# Patient Record
Sex: FEMALE | NOT HISPANIC OR LATINO | URBAN - NONMETROPOLITAN AREA
[De-identification: names, ages, dates, MRNs, and addresses within clinical notes are randomized per-mention and may not be internally consistent; named-entity substitution may affect disease eponyms.]

---

## 2019-06-04 ENCOUNTER — OFFICE VISIT (OUTPATIENT)
Dept: MEDICAL GROUP | Facility: CLINIC | Age: 29
End: 2019-06-04
Payer: MEDICAID

## 2019-06-04 ENCOUNTER — NON-PROVIDER VISIT (OUTPATIENT)
Dept: MEDICAL GROUP | Facility: CLINIC | Age: 29
End: 2019-06-04
Payer: MEDICAID

## 2019-06-04 VITALS
WEIGHT: 292 LBS | HEIGHT: 70 IN | OXYGEN SATURATION: 96 % | BODY MASS INDEX: 41.8 KG/M2 | TEMPERATURE: 97.6 F | HEART RATE: 85 BPM | DIASTOLIC BLOOD PRESSURE: 90 MMHG | SYSTOLIC BLOOD PRESSURE: 155 MMHG

## 2019-06-04 DIAGNOSIS — E78.49 OTHER HYPERLIPIDEMIA: ICD-10-CM

## 2019-06-04 DIAGNOSIS — J45.990 EXERCISE-INDUCED ASTHMA: ICD-10-CM

## 2019-06-04 DIAGNOSIS — L68.0 HIRSUTISM: ICD-10-CM

## 2019-06-04 PROCEDURE — 36415 COLL VENOUS BLD VENIPUNCTURE: CPT | Performed by: PHYSICIAN ASSISTANT

## 2019-06-04 PROCEDURE — 99202 OFFICE O/P NEW SF 15 MIN: CPT | Performed by: PHYSICIAN ASSISTANT

## 2019-06-04 RX ORDER — ALBUTEROL SULFATE 90 UG/1
2 AEROSOL, METERED RESPIRATORY (INHALATION) EVERY 6 HOURS PRN
Qty: 8.5 G | Refills: 5 | Status: SHIPPED | OUTPATIENT
Start: 2019-06-04

## 2019-06-04 NOTE — LETTER
[facility]  Pcp Pt States None  No address on file  Fax: [phone]   Authorization for Release/Disclosure of   Protected Health Information   Name: TRUDI BARRIOS : 1990 SSN: [id number]   Address: [facility] [address]  [facility] [place] [address] Phone:    [phone] (home)    I authorize the entity listed below to release/disclose the PHI below to:   [facility]/Pcp Pt States None and Deepthi Gomez P.A.-C.   Provider or Entity Name:  Dr. Sutton     Address   City, [facility]   Phone:      Fax:     Reason for request: continuity of care   Information to be released:    [  ] LAST COLONOSCOPY,  including any PATH REPORT and follow-up  [  ] LAST FIT/COLOGUARD RESULT [  ] LAST DEXA  [  ] LAST MAMMOGRAM  [  ] LAST PAP  [  ] LAST LABS [  ] RETINA EXAM REPORT  [  ] IMMUNIZATION RECORDS  [ x ] Release all info      [  ] Check here and initial the line next to each item to release ALL health information INCLUDING  _____ Care and treatment for drug and / or alcohol abuse  _____ HIV testing, infection status, or AIDS  _____ Genetic Testing    DATES OF SERVICE OR TIME PERIOD TO BE DISCLOSED: _____________  I understand and acknowledge that:  * This Authorization may be revoked at any time by you in writing, except if your health information has already been used or disclosed.  * Your health information that will be used or disclosed as a result of you signing this authorization could be re-disclosed by the recipient. If this occurs, your re-disclosed health information may no longer be protected by State or Federal laws.  * You may refuse to sign this Authorization. Your refusal will not affect your ability to obtain treatment.  * This Authorization becomes effective upon signing and will  on (date) __________.      If no date is indicated, this Authorization will  one (1) year from the signature date.    Name: Trudi Barrios    Signature:   Date:     2019       PLEASE FAX REQUESTED RECORDS BACK TO:  [phone]

## 2019-06-04 NOTE — NON-PROVIDER
Estefani Styles is a 28 y.o. female here for a non-provider visit for Venipuncture    If abnormal was an in office provider notified today (if so, indicate provider)? Yes  Routed to PCP? Yes

## 2019-06-04 NOTE — PROGRESS NOTES
"cc:  Endo referral    Subjective:     Lisandra Styles is a 28 y.o. female presenting for endocrinology referral.      Patient presents to office today requesting an endocrinology referral. Patient has a problem with hirsuitism.  She has had thyroid testing and glucose testing and heart testing in the past.  She has had difficulty loosing weight. She has tried to cut out her carbs and has increased her exercise and has not had any success.  She used to have painful periods.  She has been working at portion control and states that this has been an ongoing issue as a teen.  She is not sure about a history of enodmetriosis.  She will feel tired after heavy carbs.     Patient indicates that she has a history of exercise induced asthma.  She states that she has been out of her inhalers and indicates that she was taking a ventolin inhaler and an advair inhaler.  She does not feel that it has worked.     Review of systems:  See above.  Denies any other symptoms unless previously indicated.      Current Outpatient Prescriptions:   •  fluticasone-salmeterol (ADVAIR) 250-50 MCG/DOSE AEROSOL POWDER, BREATH ACTIVATED, Inhale 1 Puff by mouth every 12 hours., Disp: 1 Inhaler, Rfl: 5  •  albuterol 108 (90 Base) MCG/ACT Aero Soln inhalation aerosol, Inhale 2 Puffs by mouth every 6 hours as needed for Shortness of Breath., Disp: 8.5 g, Rfl: 5  •  ibuprofen (MOTRIN) 800 MG TABS, Take 800 mg by mouth every 8 hours as needed for Mild Pain. May take for pain every 6 hours , Disp: , Rfl:     Allergies, past medical history, past surgical history, family history, social history reviewed and updated    Objective:     Vitals: /90 (BP Location: Left arm, Patient Position: Sitting)   Pulse 85   Temp 36.4 °C (97.6 °F) (Temporal)   Ht 1.778 m (5' 10\")   Wt (!) 132.5 kg (292 lb)   SpO2 96%   BMI 41.90 kg/m²   General: Alert, pleasant, NAD  EYES:   PERRL, EOMI, no icterus or pallor.  Conjunctivae and lids normal.   HENT:  Normocephalic. "  External ears normal.  No nasal drainage present.  Moderate hirsutism present on chin.   Neck supple.    Heart: Regular rate and rhythm.  S1 and S2 normal.  No murmurs appreciated.  Respiratory: Decreased breath sounds with inspiration and expiration.  No accessory muscle use present.  Abdomen: Obese  Skin: Warm, dry, no rashes.  Musculoskeletal: Gait is normal.  Moves all extremities well.  Neurological: No tremors, sensation grossly intact, CN2-12 intact.  Psych:  Affect/mood is normal, judgement is good, memory is intact, grooming is appropriate.    Assessment/Plan:     Estefani was seen today for referral needed.    Diagnoses and all orders for this visit:    Hirsutism  -     CBC WITH DIFFERENTIAL; Future  -     Comp Metabolic Panel; Future  -     TSH+FREE T4  -     TESTOSTERONE, FREE, FEMALES/CHILDREN; Future  -     FSH/LH; Future  -     CORTISOL; Future  -     ESTROGENS FRACTIONATED; Future    Labs been ordered to evaluate further.  Consideration given for spironolactone but would like to have test results first.  Will reconsider with follow-up.    Exercise-induced asthma  -     fluticasone-salmeterol (ADVAIR) 250-50 MCG/DOSE AEROSOL POWDER, BREATH ACTIVATED; Inhale 1 Puff by mouth every 12 hours.  -     albuterol 108 (90 Base) MCG/ACT Aero Soln inhalation aerosol; Inhale 2 Puffs by mouth every 6 hours as needed for Shortness of Breath.    We will start patient on Advair 250/50 and add albuterol as the rescue inhaler.  We will follow-up in 2 to 4 weeks.  If no improvement, consider spirometry.    Other hyperlipidemia  -     Lipid Profile; Future  Labs been ordered to evaluate further.      Return in about 4 weeks (around 7/2/2019), or if symptoms worsen or fail to improve, for 2-4 weeks.    Please note that this dictation was created using voice recognition software. I have made every reasonable attempt to correct obvious errors, but expect that there are errors of grammar and possible content that I did not  discover before finalizing note.

## 2019-07-02 ENCOUNTER — OFFICE VISIT (OUTPATIENT)
Dept: MEDICAL GROUP | Facility: CLINIC | Age: 29
End: 2019-07-02
Payer: MEDICAID

## 2019-07-02 VITALS
BODY MASS INDEX: 41.95 KG/M2 | DIASTOLIC BLOOD PRESSURE: 92 MMHG | HEART RATE: 75 BPM | HEIGHT: 70 IN | SYSTOLIC BLOOD PRESSURE: 162 MMHG | RESPIRATION RATE: 16 BRPM | OXYGEN SATURATION: 95 % | WEIGHT: 293 LBS | TEMPERATURE: 98.1 F

## 2019-07-02 DIAGNOSIS — D75.89 MACROCYTOSIS: ICD-10-CM

## 2019-07-02 DIAGNOSIS — L68.0 HIRSUTISM: ICD-10-CM

## 2019-07-02 DIAGNOSIS — R73.9 HYPERGLYCEMIA: ICD-10-CM

## 2019-07-02 DIAGNOSIS — J45.990 EXERCISE-INDUCED ASTHMA: ICD-10-CM

## 2019-07-02 DIAGNOSIS — R79.89 ELEVATED TESTOSTERONE LEVEL IN FEMALE: ICD-10-CM

## 2019-07-02 DIAGNOSIS — R79.89 ELEVATED LFTS: ICD-10-CM

## 2019-07-02 DIAGNOSIS — E78.49 OTHER HYPERLIPIDEMIA: ICD-10-CM

## 2019-07-02 PROCEDURE — 99214 OFFICE O/P EST MOD 30 MIN: CPT | Performed by: PHYSICIAN ASSISTANT

## 2019-07-02 NOTE — PROGRESS NOTES
"cc:  Follow up labs    Subjective:     Estefani Styles is a 29 y.o. female presenting for follow up labs      Patient presents to the office to follow up with lab results. She denies fever, chills and nausea. She has been to gynecology in the past and has not felt that she has had much success.  She has had episodes of amenorrhea and incredibly painful periods.  She states that in the past she has been told that she has polycystic ovaries although no cysts were ever seen on ultrasound.  She has been tried on birth control which has not been helpful.  She has been dealing with this for many years and is quite frustrated that she has not been able to make any progress peer    She states that she has been trying to use the advair on a daily basis.  She states that she has not noticed it helped, but when she misses a dose, she does find it's harder to breath.  She does acknowledge that she has not used her albuterol as often indicating that she has been without it for years and forgets that she has it.        Review of systems:  See above.  Denies any other symptoms unless previously indicated.        Current Outpatient Prescriptions:   •  fluticasone-salmeterol (ADVAIR) 500-50 MCG/DOSE AEROSOL POWDER, BREATH ACTIVATED, Inhale 1 Puff by mouth every 12 hours., Disp: 1 Inhaler, Rfl: 5  •  albuterol 108 (90 Base) MCG/ACT Aero Soln inhalation aerosol, Inhale 2 Puffs by mouth every 6 hours as needed for Shortness of Breath., Disp: 8.5 g, Rfl: 5  •  ibuprofen (MOTRIN) 800 MG TABS, Take 800 mg by mouth every 8 hours as needed for Mild Pain. May take for pain every 6 hours , Disp: , Rfl:     Allergies, past medical history, past surgical history, family history, social history reviewed and updated    Objective:     Vitals: BP (!) 162/92 (BP Location: Right arm, Patient Position: Sitting)   Pulse 75   Temp 36.7 °C (98.1 °F)   Resp 16   Ht 1.778 m (5' 10\")   Wt (!) 133.8 kg (295 lb)   SpO2 95%   BMI 42.33 kg/m²   General: " Alert, pleasant, NAD  EYES:   PERRL, EOMI, no icterus or pallor.  Conjunctivae and lids normal.   HENT:  Normocephalic.  External ears normal.  Neck supple.  Hisuitism was some of the chin.  Heart: Regular rate and rhythm.  S1 and S2 normal.  No murmurs appreciated.  Respiratory: Normal respiratory effort.  Clear to auscultation bilaterally.  Decreased breath sounds with expiration.  Abdomen:  obese  Skin: Warm, dry, no rashes.  Musculoskeletal: Gait is normal.  Moves all extremities well.  Neurological: No tremors, sensation grossly intact.  CN2-12 intact.  Psych:  Affect/mood is normal, judgement is good, memory is intact, grooming is appropriate.    Assessment/Plan:     Estefani was seen today for follow-up.    Diagnoses and all orders for this visit:    Elevated testosterone level in female  -     REFERRAL TO ENDOCRINOLOGY  Hirsutism  -     REFERRAL TO ENDOCRINOLOGY  Patient has been to gynecology with no success.  Testosterone levels are elevated.  Will refer patient to endocrinology for further evaluation.  Consideration given for spironolactone.  However due to abnormal testosterone levels I would like to hold off on this medication and obtain endocrinology's recommendations first.  Discussed gynecology referral but as patient has been in the past, she would like to try endocrinology first.    Exercise-induced asthma  -     fluticasone-salmeterol (ADVAIR) 500-50 MCG/DOSE AEROSOL POWDER, BREATH ACTIVATED; Inhale 1 Puff by mouth every 12 hours.    Not controlled.  We will start patient on Advair 550.  Advised patient that if she is short of breath or her O2 levels are dropping, she does need to use her albuterol inhaler.    Other hyperlipidemia  -     Lipid Profile; Future  -     Comp Metabolic Panel; Future  Hyperglycemia  -     Lipid Profile; Future  -     Comp Metabolic Panel; Future  Elevated LFTs  -     Comp Metabolic Panel; Future  We will repeat labs in 3 months.  Orders placed at this time  peer    Macrocytosis  -     CBC WITH DIFFERENTIAL; Future    Macrocytosis may be due to elevated testosterone levels.  We will continue to monitor and follow endocrinology recommendations.    Return in about 3 months (around 10/2/2019), or if symptoms worsen or fail to improve.    Please note that this dictation was created using voice recognition software. I have made every reasonable attempt to correct obvious errors, but expect that there are errors of grammar and possible content that I did not discover before finalizing note.